# Patient Record
Sex: FEMALE | Race: BLACK OR AFRICAN AMERICAN | NOT HISPANIC OR LATINO | Employment: STUDENT | URBAN - METROPOLITAN AREA
[De-identification: names, ages, dates, MRNs, and addresses within clinical notes are randomized per-mention and may not be internally consistent; named-entity substitution may affect disease eponyms.]

---
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
## 2024-07-28 ENCOUNTER — OFFICE VISIT (OUTPATIENT)
Dept: URGENT CARE | Facility: CLINIC | Age: 19
End: 2024-07-28
Payer: MEDICAID

## 2024-07-28 VITALS
TEMPERATURE: 104 F | HEIGHT: 62 IN | OXYGEN SATURATION: 98 % | RESPIRATION RATE: 16 BRPM | BODY MASS INDEX: 35.88 KG/M2 | DIASTOLIC BLOOD PRESSURE: 81 MMHG | WEIGHT: 195 LBS | HEART RATE: 142 BPM | SYSTOLIC BLOOD PRESSURE: 130 MMHG

## 2024-07-28 DIAGNOSIS — U07.1 COVID-19: Primary | ICD-10-CM

## 2024-07-28 DIAGNOSIS — J02.9 SORE THROAT: ICD-10-CM

## 2024-07-28 LAB
CTP QC/QA: YES
FLUAV AG NPH QL: NEGATIVE
FLUBV AG NPH QL: NEGATIVE
S PYO RRNA THROAT QL PROBE: NEGATIVE
SARS-COV-2 AG RESP QL IA.RAPID: POSITIVE

## 2024-07-28 PROCEDURE — 99204 OFFICE O/P NEW MOD 45 MIN: CPT | Mod: S$GLB,,,

## 2024-07-28 PROCEDURE — 87811 SARS-COV-2 COVID19 W/OPTIC: CPT | Mod: QW,S$GLB,,

## 2024-07-28 PROCEDURE — 87804 INFLUENZA ASSAY W/OPTIC: CPT | Mod: QW,,,

## 2024-07-28 PROCEDURE — 87880 STREP A ASSAY W/OPTIC: CPT | Mod: QW,,,

## 2024-07-28 RX ORDER — BROMPHENIRAMINE MALEATE, PSEUDOEPHEDRINE HYDROCHLORIDE, AND DEXTROMETHORPHAN HYDROBROMIDE 2; 30; 10 MG/5ML; MG/5ML; MG/5ML
10 SYRUP ORAL 4 TIMES DAILY PRN
Qty: 118 ML | Refills: 0 | Status: SHIPPED | OUTPATIENT
Start: 2024-07-28 | End: 2024-08-07

## 2024-07-28 RX ORDER — PROMETHAZINE HYDROCHLORIDE AND DEXTROMETHORPHAN HYDROBROMIDE 6.25; 15 MG/5ML; MG/5ML
5 SYRUP ORAL NIGHTLY PRN
Qty: 50 ML | Refills: 0 | Status: SHIPPED | OUTPATIENT
Start: 2024-07-28 | End: 2024-08-07

## 2024-07-28 RX ORDER — NIRMATRELVIR AND RITONAVIR 300-100 MG
KIT ORAL
Qty: 30 TABLET | Refills: 0 | Status: SHIPPED | OUTPATIENT
Start: 2024-07-28

## 2024-07-28 RX ORDER — ALBUTEROL SULFATE 90 UG/1
2 AEROSOL, METERED RESPIRATORY (INHALATION) EVERY 6 HOURS PRN
Qty: 6.7 G | Refills: 0 | Status: SHIPPED | OUTPATIENT
Start: 2024-07-28 | End: 2024-08-27

## 2024-07-28 NOTE — PATIENT INSTRUCTIONS
Promethazine DM at nighttime only.  The medication does cause sedation    Take Paxlovid with a strong juice such as orange juice to help with after taste, and sore throat.    There is a 20% chance you will develop rebound COVID while taking Paxlovid.  This means if you develop symptoms again in 14 days to get retested.    Do not take any additional antihistamines, or decongestants while taking Bromfed.

## 2024-07-28 NOTE — LETTER
July 31, 2024      [facility]  [address]  [facility] [address]  Phone: [phone]       Patient: Chandan Evans   YOB: 2005  Date of Visit: 07/31/2024    To Whom It May Concern:    Angel Evans  was at [facility] on 07/31/2024. The patient may return to work/school on 08/01/2024 with no restrictions. If you have any questions or concerns, or if I can be of further assistance, please do not hesitate to contact me.    Sincerely,    Alie Schmidt, RT

## 2024-07-28 NOTE — LETTER
July 28, 2024      [facility]  [address]  [facility] [address]  Phone: [phone]       Patient: Chandan Evans   YOB: 2005  Date of Visit: 07/28/2024    To Whom It May Concern:    Angel Evans  was at [facility] on 07/28/2024. The patient may return to work/school on 8/3/24 with no restrictions. If you have any questions or concerns, or if I can be of further assistance, please do not hesitate to contact me.    Sincerely,    LUCÍA Burrows

## 2024-07-28 NOTE — PROGRESS NOTES
"Subjective:      Patient ID: Chandan Evans is a 18 y.o. female.    Vitals:  height is 5' 2" (1.575 m) and weight is 88.5 kg (195 lb). Her temperature is 103.7 °F (39.8 °C) (abnormal). Her blood pressure is 130/81 and her pulse is 142 (abnormal). Her respiration is 16 and oxygen saturation is 98%.     Chief Complaint: Sore Throat and Cough    In clinic with a chief complaint of fever, sore throat, and dry cough that began yesterday morning.  Patient is not COVID vaccinated, but has had previous COVID-19 infections.        Constitution: Positive for fever.   HENT:  Positive for congestion and sore throat.    Neck: neck negative.   Cardiovascular: Negative.    Eyes: Negative.    Respiratory:  Positive for cough. Negative for sputum production.    Gastrointestinal: Negative.    Endocrine: negative.   Genitourinary: Negative.    Musculoskeletal:  Positive for muscle ache.   Skin: Negative.    Allergic/Immunologic: Positive for immunizations up-to-date. Negative for flu shot.   Neurological:  Positive for headaches.   Hematologic/Lymphatic: Negative.    Psychiatric/Behavioral: Negative.        Objective:     Physical Exam   Constitutional: She is oriented to person, place, and time. She is cooperative.   HENT:   Head: Normocephalic and atraumatic.   Ears:   Right Ear: Tympanic membrane, external ear and ear canal normal.   Left Ear: Tympanic membrane, external ear and ear canal normal.   Nose: Nose normal.   Mouth/Throat: Uvula is midline and mucous membranes are normal. Mucous membranes are moist. Posterior oropharyngeal erythema present. No oropharyngeal exudate. Oropharynx is clear.   Eyes: Conjunctivae and lids are normal. Pupils are equal, round, and reactive to light. Extraocular movement intact   Neck: Trachea normal and phonation normal. Neck supple.   Cardiovascular: Regular rhythm, normal heart sounds and normal pulses. Tachycardia present.   Pulmonary/Chest: Effort normal and breath sounds normal. "   Abdominal: Normal appearance. Soft. flat abdomen There is no abdominal tenderness.   Musculoskeletal: Normal range of motion.         General: Normal range of motion.   Neurological: no focal deficit. She is alert, oriented to person, place, and time and at baseline. She has normal motor skills, normal sensation and intact cranial nerves (2-12). Gait and coordination normal. GCS eye subscore is 4. GCS verbal subscore is 5. GCS motor subscore is 6.   Skin: Skin is warm and dry. Capillary refill takes 2 to 3 seconds.   Psychiatric: She experiences Normal attention and Normal perception. Her speech is normal and behavior is normal. Mood, judgment and thought content normal.   Nursing note and vitals reviewed.      Assessment:     1. COVID-19    2. Sore throat    1 COVID risk score        Plan:       COVID-19  -     albuterol (PROVENTIL HFA) 90 mcg/actuation inhaler; Inhale 2 puffs into the lungs every 6 (six) hours as needed for Wheezing. Rescue  Dispense: 6.7 g; Refill: 0  -     promethazine-dextromethorphan (PROMETHAZINE-DM) 6.25-15 mg/5 mL Syrp; Take 5 mLs by mouth nightly as needed (night time cough).  Dispense: 50 mL; Refill: 0  -     nirmatrelvir-ritonavir (PAXLOVID) 300 mg (150 mg x 2)-100 mg copackaged tablets (EUA); Take 3 tablets by mouth 2 (two) times daily. Each dose contains 2 nirmatrelvir (pink tablets) and 1 ritonavir (white tablet). Take all 3 tablets together  Dispense: 30 tablet; Refill: 0  -     brompheniramine-pseudoeph-DM (BROMFED DM) 2-30-10 mg/5 mL Syrp; Take 10 mLs by mouth 4 (four) times daily as needed (Cough).  Dispense: 118 mL; Refill: 0    Sore throat  -     SARS Coronavirus 2 Antigen, POCT Manual Read  -     POCT Influenza A/B Rapid Antigen  -     POCT rapid strep A      Risk score 0.  Rapid COVID positive.  Mild symptoms.  However oral temp of 103.7° with a 142 pulse.  She has been encouraged to begin defervescing at home alternate between Tylenol and Motrin.  Paxlovid prescribed as  well even though she has a low score.  She is also a type 2 diabetic.  ER precautions discussed with the patient

## 2024-08-16 ENCOUNTER — OFFICE VISIT (OUTPATIENT)
Dept: URGENT CARE | Facility: CLINIC | Age: 19
End: 2024-08-16
Payer: MEDICAID

## 2024-08-16 VITALS
DIASTOLIC BLOOD PRESSURE: 95 MMHG | HEART RATE: 91 BPM | OXYGEN SATURATION: 100 % | SYSTOLIC BLOOD PRESSURE: 136 MMHG | TEMPERATURE: 98 F | RESPIRATION RATE: 18 BRPM

## 2024-08-16 DIAGNOSIS — N89.8 VAGINAL IRRITATION: Primary | ICD-10-CM

## 2024-08-16 DIAGNOSIS — Z86.19 HISTORY OF CHLAMYDIA: ICD-10-CM

## 2024-08-16 DIAGNOSIS — Z11.3 ROUTINE SCREENING FOR STI (SEXUALLY TRANSMITTED INFECTION): ICD-10-CM

## 2024-08-16 LAB
B-HCG UR QL: NEGATIVE
BILIRUB UR QL STRIP: NEGATIVE
CTP QC/QA: YES
GLUCOSE UR QL STRIP: NEGATIVE
KETONES UR QL STRIP: NEGATIVE
LEUKOCYTE ESTERASE UR QL STRIP: NEGATIVE
PH, POC UA: 6
POC BLOOD, URINE: NEGATIVE
POC NITRATES, URINE: NEGATIVE
PROT UR QL STRIP: NEGATIVE
SP GR UR STRIP: 1 (ref 1–1.03)
UROBILINOGEN UR STRIP-ACNC: 0.2 (ref 0.1–1.1)

## 2024-08-17 NOTE — PROGRESS NOTES
Subjective:      Patient ID: Chandan Evans is a 18 y.o. female.    Vitals:  temperature is 97.9 °F (36.6 °C). Her blood pressure is 136/95 (abnormal) and her pulse is 91. Her respiration is 18 and oxygen saturation is 100%.     Chief Complaint: Female  Problem    Patient is a 18-year-old female who presents to clinic for STI check.  Patient reports history of chlamydia.  Patient reports last with chlamydia in June of 2024.  Patient reports treated with doxycycline.  Patient reports had urinalysis at [facility] which showed chlamydia.  Patient reports that she returned after treatment and was told it was negative.  Patient reports that she has began having some vaginal irritation over the past few days.  Patient states she has not had any dysuria, urinary frequency, urinary urgency, urinary hesitancy or retention, flank pain or pelvic pain, vaginal bleeding, vaginal discharge, vaginal odor, vaginal itching, back pain, abdominal pain, nausea or vomiting, fever or chills.  Patient states just feels agitated down there.  Patient states no over-the-counter medications for symptoms at this point.  Patient reports no significant history of UTIs.  Patient reports no concern for pregnancy.  Patient reports that she does not believes she has a yeast infection.  Patient states that she was sexually active unprotected with 1 individual.        Constitution: Negative. Negative for chills, sweating, fatigue and fever.   HENT: Negative.     Neck: neck negative.   Cardiovascular: Negative.  Negative for chest pain and palpitations.   Eyes: Negative.    Respiratory: Negative.  Negative for chest tightness, cough and shortness of breath.    Gastrointestinal: Negative.  Negative for abdominal pain, nausea, vomiting and diarrhea.   Endocrine: negative.   Genitourinary:  Negative for dysuria, frequency, urgency, urine decreased, flank pain, vaginal discharge, vaginal bleeding, vaginal odor and pelvic pain.        Reports  having some vaginal irritation   Musculoskeletal: Negative.  Negative for back pain.   Skin: Negative.  Negative for color change, pale, rash and erythema.   Allergic/Immunologic: Negative.    Neurological: Negative.  Negative for dizziness, light-headedness, passing out, headaches, disorientation and altered mental status.   Hematologic/Lymphatic: Negative.    Psychiatric/Behavioral: Negative.  Negative for altered mental status, disorientation and confusion.       Objective:     Physical Exam   Constitutional: She is oriented to person, place, and time. She appears well-developed. She is cooperative.  Non-toxic appearance. She does not appear ill. No distress.   HENT:   Head: Normocephalic and atraumatic.   Ears:   Right Ear: Hearing and external ear normal.   Left Ear: Hearing and external ear normal.   Nose: Nose normal. No mucosal edema or nasal deformity. No epistaxis. Right sinus exhibits no maxillary sinus tenderness and no frontal sinus tenderness. Left sinus exhibits no maxillary sinus tenderness and no frontal sinus tenderness.   Mouth/Throat: Uvula is midline, oropharynx is clear and moist and mucous membranes are normal. No trismus in the jaw. Normal dentition. No uvula swelling.   Eyes: Conjunctivae and lids are normal. Pupils are equal, round, and reactive to light.   Neck: Trachea normal and phonation normal. Neck supple.   Cardiovascular: Normal rate, regular rhythm, normal heart sounds and normal pulses.   Pulmonary/Chest: Effort normal and breath sounds normal. No respiratory distress. She has no wheezes. She has no rhonchi. She has no rales.   Abdominal: Normal appearance and bowel sounds are normal. She exhibits no distension. Soft. There is no abdominal tenderness. There is no rebound, no guarding, no left CVA tenderness and no right CVA tenderness.   Genitourinary:         Comments: Deferred by patient.     Musculoskeletal: Normal range of motion.         General: Normal range of motion.    Neurological: She is alert and oriented to person, place, and time. She exhibits normal muscle tone.   Skin: Skin is warm, dry, intact, not diaphoretic, not pale and no rash. Capillary refill takes less than 2 seconds. No erythema   Psychiatric: Her speech is normal and behavior is normal. Judgment and thought content normal.   Nursing note and vitals reviewed.      Assessment:     1. Vaginal irritation    2. History of chlamydia    3. Routine screening for STI (sexually transmitted infection)        Plan:       Vaginal irritation  -     POCT Urinalysis, Dipstick, Manual, W/O Scope  -     POCT urine pregnancy  -     CT/NG, T. vaginalis; Future; Expected date: 08/16/2024    History of chlamydia  -     POCT Urinalysis, Dipstick, Manual, W/O Scope  -     POCT urine pregnancy  -     CT/NG, T. vaginalis; Future; Expected date: 08/16/2024    Routine screening for STI (sexually transmitted infection)                Patient refused NuSwab.  Patient reports has a Pap coming up with OBGYN and would get swabbed there.  UA:  Negative  UPT:  Negative  Urine STI panel collected; will call with results.    Recommend avoidance of sexual intercourse until receiving results.    Tylenol/Motrin per package instructions for any pain or fever.    Assure adequate hydration.    Follow-up with PCP or OBGYN in 1-2 days.    Return to clinic as needed.    To ED for any new or acutely worsening symptoms.    Patient in agreement with plan of care.    DISCLAIMER: Please note that my documentation in this Electronic Healthcare Record was produced using speech recognition software and therefore may contain errors related to that software system.These could include grammar, punctuation and spelling errors or the inclusion/exclusion of phrases that were not intended. Garbled syntax, mangled pronouns, and other bizarre constructions may be attributed to that software system.

## 2024-08-21 ENCOUNTER — OFFICE VISIT (OUTPATIENT)
Dept: URGENT CARE | Facility: CLINIC | Age: 19
End: 2024-08-21
Payer: MEDICAID

## 2024-08-21 ENCOUNTER — TELEPHONE (OUTPATIENT)
Dept: URGENT CARE | Facility: CLINIC | Age: 19
End: 2024-08-21
Payer: MEDICAID

## 2024-08-21 VITALS
DIASTOLIC BLOOD PRESSURE: 94 MMHG | WEIGHT: 201 LBS | OXYGEN SATURATION: 99 % | TEMPERATURE: 98 F | SYSTOLIC BLOOD PRESSURE: 139 MMHG | BODY MASS INDEX: 36.99 KG/M2 | HEART RATE: 83 BPM | RESPIRATION RATE: 16 BRPM | HEIGHT: 62 IN

## 2024-08-21 DIAGNOSIS — A54.9 GONORRHEA: Primary | ICD-10-CM

## 2024-08-21 DIAGNOSIS — N76.0 ACUTE VAGINITIS: ICD-10-CM

## 2024-08-21 PROCEDURE — 99214 OFFICE O/P EST MOD 30 MIN: CPT | Mod: S$GLB,,, | Performed by: STUDENT IN AN ORGANIZED HEALTH CARE EDUCATION/TRAINING PROGRAM

## 2024-08-21 RX ORDER — CEFTRIAXONE 1 G/1
1 INJECTION, POWDER, FOR SOLUTION INTRAMUSCULAR; INTRAVENOUS
Status: COMPLETED | OUTPATIENT
Start: 2024-08-21 | End: 2024-08-21

## 2024-08-21 RX ORDER — DESOGESTREL AND ETHINYL ESTRADIOL 0.15-0.03
1 KIT ORAL
COMMUNITY
Start: 2024-07-31

## 2024-08-21 RX ORDER — FLUCONAZOLE 150 MG/1
150 TABLET ORAL
Qty: 3 TABLET | Refills: 0 | Status: SHIPPED | OUTPATIENT
Start: 2024-08-21

## 2024-08-21 RX ORDER — METFORMIN HYDROCHLORIDE 500 MG/1
500 TABLET ORAL 2 TIMES DAILY
COMMUNITY
Start: 2024-04-11

## 2024-08-21 RX ORDER — ASPIRIN 325 MG
TABLET, DELAYED RELEASE (ENTERIC COATED) ORAL
COMMUNITY

## 2024-08-21 RX ADMIN — CEFTRIAXONE 1 G: 1 INJECTION, POWDER, FOR SOLUTION INTRAMUSCULAR; INTRAVENOUS at 07:08

## 2024-08-21 NOTE — PROGRESS NOTES
"Subjective:      Patient ID: Chandan Evans is a 18 y.o. female.    Vitals:  height is 5' 2" (1.575 m) and weight is 91.2 kg (201 lb). Her oral temperature is 98.3 °F (36.8 °C). Her blood pressure is 139/94 (abnormal) and her pulse is 83. Her respiration is 16 and oxygen saturation is 99%.     Chief Complaint: Exposure to STD (Return for Shot)    Patient is a 18-year-old female who returns to clinic for Rocephin injection.  Patient notified this morning that her urine STI panel returned positive for gonorrhea.  Patient reports that she does have a history of this.  Patient reports she is no longer sexually active as hurting her partner are done.  Patient reports that she continues to only have that of vaginal irritation to include itching.  Patient states she still does not wish to be swabbed and will let her OBGYN do this at her next follow-up.    Visit note from 08/16/2024: Patient is a 18-year-old female who presents to clinic for STI check.  Patient reports history of chlamydia.  Patient reports last with chlamydia in June of 2024.  Patient reports treated with doxycycline.  Patient reports had urinalysis at [facility] which showed chlamydia.  Patient reports that she returned after treatment and was told it was negative.  Patient reports that she has began having some vaginal irritation over the past few days.  Patient states she has not had any dysuria, urinary frequency, urinary urgency, urinary hesitancy or retention, flank pain or pelvic pain, vaginal bleeding, vaginal discharge, vaginal odor, vaginal itching, back pain, abdominal pain, nausea or vomiting, fever or chills.  Patient states just feels agitated down there.  Patient states no over-the-counter medications for symptoms at this point.  Patient reports no significant history of UTIs.  Patient reports no concern for pregnancy.  Patient reports that she does not believes she has a yeast infection.  Patient states that she was sexually active " unprotected with 1 individual.         Exposure to STD   The patient's primary symptoms include a discharge. The patient's pertinent negatives include no dysuria, genital itching or pelvic pain. This is a new problem. The current episode started in the past 7 days. The problem has been unchanged. The patient is experiencing no pain.The vaginal discharge was normal. Associated symptoms include rash. Pertinent negatives include no abdominal pain, chills, diaphoresis, diarrhea, fatigue, fever, flank pain, headaches, nausea, urinary frequency or vomiting. The symptoms are aggravated by: nothing.She has tried nothing for the symptoms. The treatment provided no relief.       Constitution: Negative. Negative for chills, sweating, fatigue and fever.   HENT: Negative.     Neck: neck negative.   Cardiovascular: Negative.  Negative for chest pain and palpitations.   Eyes: Negative.    Respiratory: Negative.  Negative for chest tightness, cough and shortness of breath.    Gastrointestinal: Negative.  Negative for abdominal pain, nausea, vomiting and diarrhea.   Endocrine: negative.   Genitourinary:  Negative for dysuria, frequency, urgency, urine decreased, flank pain, vaginal discharge, vaginal bleeding, vaginal odor, genital sore and pelvic pain.        Vaginal irritation (itching)   Musculoskeletal: Negative.    Skin: Negative.  Negative for color change, pale, rash and erythema.   Allergic/Immunologic: Negative.    Neurological: Negative.  Negative for dizziness, light-headedness, passing out, headaches, disorientation and altered mental status.   Hematologic/Lymphatic: Negative.    Psychiatric/Behavioral: Negative.  Negative for altered mental status, disorientation and confusion.       Objective:     Physical Exam   Constitutional: She is oriented to person, place, and time. She appears well-developed. She is cooperative.  Non-toxic appearance. She does not appear ill. No distress.   HENT:   Head: Normocephalic and  atraumatic.   Ears:   Right Ear: Hearing and external ear normal.   Left Ear: Hearing and external ear normal.   Nose: Nose normal. No mucosal edema or nasal deformity. No epistaxis. Right sinus exhibits no maxillary sinus tenderness and no frontal sinus tenderness. Left sinus exhibits no maxillary sinus tenderness and no frontal sinus tenderness.   Mouth/Throat: Uvula is midline, oropharynx is clear and moist and mucous membranes are normal. No trismus in the jaw. Normal dentition. No uvula swelling.   Eyes: Conjunctivae and lids are normal. Pupils are equal, round, and reactive to light.   Neck: Trachea normal and phonation normal. Neck supple.   Cardiovascular: Normal rate, regular rhythm, normal heart sounds and normal pulses.   Pulmonary/Chest: Effort normal and breath sounds normal. No respiratory distress. She has no wheezes. She has no rhonchi. She has no rales.   Abdominal: Normal appearance and bowel sounds are normal. She exhibits no distension. Soft. There is no abdominal tenderness. There is no rebound, no guarding, no left CVA tenderness and no right CVA tenderness.   Genitourinary:    Labial rash present.           Comments: Deferred by patient     Musculoskeletal: Normal range of motion.         General: Normal range of motion.   Neurological: She is alert and oriented to person, place, and time. She exhibits normal muscle tone.   Skin: Skin is warm, dry, intact, not diaphoretic, not pale and no rash. Capillary refill takes less than 2 seconds. No erythema   Psychiatric: Her speech is normal and behavior is normal. Judgment and thought content normal.   Nursing note and vitals reviewed.    Assessment:     1. Gonorrhea    2. Acute vaginitis        Plan:       Gonorrhea    Acute vaginitis    Other orders  -     cefTRIAXone injection 1 g  -     fluconazole (DIFLUCAN) 150 MG Tab; Take 1 tablet (150 mg total) by mouth every 72 hours.  Dispense: 3 tablet; Refill: 0                Previous visit and labs  reviewed.    Rocephin 1 g IM in clinic.  Patient tolerated well.  No complications noted.    Take medications as prescribed.    Patient refused need for coverage of that of chlamydia.    Follow-up with PCP or OBGYN in 1-2 days.    Advised to notify partners of results however states she does no longer have any partners.    Return to clinic as needed.    To ED for any new or acutely worsening symptoms.    Patient in agreement with plan of care.    DISCLAIMER: Please note that my documentation in this Electronic Healthcare Record was produced using speech recognition software and therefore may contain errors related to that software system.These could include grammar, punctuation and spelling errors or the inclusion/exclusion of phrases that were not intended. Garbled syntax, mangled pronouns, and other bizarre constructions may be attributed to that software system.

## 2024-08-21 NOTE — TELEPHONE ENCOUNTER
Patient called and notified of positive urine STI results.  Positive gonorrhea results.  Patient reports will present to clinic this afternoon for Rocephin injection.

## 2025-04-07 ENCOUNTER — OFFICE VISIT (OUTPATIENT)
Dept: URGENT CARE | Facility: CLINIC | Age: 20
End: 2025-04-07
Payer: COMMERCIAL

## 2025-04-07 VITALS
HEIGHT: 62 IN | OXYGEN SATURATION: 99 % | WEIGHT: 201 LBS | RESPIRATION RATE: 18 BRPM | DIASTOLIC BLOOD PRESSURE: 79 MMHG | TEMPERATURE: 99 F | BODY MASS INDEX: 36.99 KG/M2 | SYSTOLIC BLOOD PRESSURE: 138 MMHG | HEART RATE: 89 BPM

## 2025-04-07 DIAGNOSIS — K59.00 CONSTIPATION, UNSPECIFIED CONSTIPATION TYPE: ICD-10-CM

## 2025-04-07 DIAGNOSIS — M54.9 BACK PAIN, UNSPECIFIED BACK LOCATION, UNSPECIFIED BACK PAIN LATERALITY, UNSPECIFIED CHRONICITY: Primary | ICD-10-CM

## 2025-04-07 PROBLEM — L83 ACANTHOSIS NIGRICANS: Status: ACTIVE | Noted: 2025-04-07

## 2025-04-07 PROBLEM — R94.6 ABNORMAL FINDING ON THYROID FUNCTION TEST: Status: ACTIVE | Noted: 2023-03-15

## 2025-04-07 PROBLEM — E88.819 INSULIN RESISTANCE: Status: ACTIVE | Noted: 2021-03-11

## 2025-04-07 LAB
B-HCG UR QL: NEGATIVE
BILIRUB UR QL STRIP: NEGATIVE
CTP QC/QA: YES
GLUCOSE UR QL STRIP: NEGATIVE
KETONES UR QL STRIP: NEGATIVE
LEUKOCYTE ESTERASE UR QL STRIP: NEGATIVE
PH, POC UA: 6
POC BLOOD, URINE: NEGATIVE
POC NITRATES, URINE: NEGATIVE
PROT UR QL STRIP: NEGATIVE
SP GR UR STRIP: 1.01 (ref 1–1.03)
UROBILINOGEN UR STRIP-ACNC: 0.2 (ref 0.1–1.1)

## 2025-04-07 PROCEDURE — 74018 RADEX ABDOMEN 1 VIEW: CPT | Mod: S$GLB,,, | Performed by: RADIOLOGY

## 2025-04-07 PROCEDURE — 99214 OFFICE O/P EST MOD 30 MIN: CPT | Mod: S$GLB,,, | Performed by: NURSE PRACTITIONER

## 2025-04-07 PROCEDURE — 81003 URINALYSIS AUTO W/O SCOPE: CPT | Mod: QW,S$GLB,, | Performed by: NURSE PRACTITIONER

## 2025-04-07 PROCEDURE — 81025 URINE PREGNANCY TEST: CPT | Mod: S$GLB,,, | Performed by: NURSE PRACTITIONER

## 2025-04-07 RX ORDER — DOXYCYCLINE 100 MG/1
100 CAPSULE ORAL 2 TIMES DAILY
COMMUNITY
Start: 2024-11-20 | End: 2025-04-07

## 2025-04-07 RX ORDER — TRIAMCINOLONE ACETONIDE 1 MG/G
CREAM TOPICAL
COMMUNITY
Start: 2025-01-31

## 2025-04-07 NOTE — PROGRESS NOTES
"Subjective:      Patient ID: Chandan Evans is a 19 y.o. female.    Vitals:  height is 5' 2" (1.575 m) and weight is 91.2 kg (201 lb). Her oral temperature is 98.6 °F (37 °C). Her blood pressure is 138/79 and her pulse is 89. Her respiration is 18 and oxygen saturation is 99%.     Chief Complaint: Back Pain    Onset of symptoms 1+ week reporting that she has intermittent pain/discomfort to her left side symptoms radiates across to her lower mid back.  Denies injury or trauma, denies similar occurrences in the past.  States that she has been experiencing some constipation and did take MiraLax on Friday with results Saturday prior to the she was passing small nugget side stool    Back Pain  This is a new problem. The current episode started in the past 7 days. The problem occurs constantly. The problem is unchanged. The pain is present in the thoracic spine, lumbar spine and gluteal. The quality of the pain is described as stabbing. The pain does not radiate. The pain is moderate. The pain is Worse during the night. Pertinent negatives include no abdominal pain, dysuria, fever or pelvic pain.       Constitution: Positive for appetite change (Early satiety). Negative for activity change, fatigue, fever and generalized weakness.   HENT:  Negative for congestion and sore throat.    Respiratory:  Negative for chest tightness, cough and shortness of breath.    Gastrointestinal:  Positive for constipation. Negative for abdominal trauma, abdominal pain, nausea, vomiting, diarrhea, bright red blood in stool and dark colored stools.   Genitourinary:  Negative for dysuria, frequency, urgency, flank pain, hematuria, vaginal discharge, vaginal bleeding, vaginal odor, genital sore and pelvic pain.   Musculoskeletal:  Positive for pain (Left side) and back pain. Negative for trauma and muscle ache.   Skin:  Negative for rash, wound, lesion, erythema and bruising.      Objective:     Physical Exam   Constitutional: She is " oriented to person, place, and time. She is cooperative.  Non-toxic appearance. She does not appear ill. No distress. obesityawake  HENT:   Head: Normocephalic and atraumatic.   Nose: No rhinorrhea or congestion.   Mouth/Throat: Mucous membranes are moist.   Eyes: Conjunctivae are normal. Right eye exhibits no discharge. Left eye exhibits no discharge.   Cardiovascular: Normal rate, regular rhythm and normal heart sounds.   Pulmonary/Chest: Effort normal and breath sounds normal. No accessory muscle usage. No tachypnea. No respiratory distress. She has no wheezes. She has no rhonchi. She has no rales. She exhibits no tenderness.   Abdominal: Normal appearance. She exhibits no distension. Soft. flat abdomen There is no abdominal tenderness. There is no rebound, no guarding, no left CVA tenderness and no right CVA tenderness.   Neurological: no focal deficit. She is alert and oriented to person, place, and time. No sensory deficit.   Skin: Skin is warm, dry, not diaphoretic and no rash. Capillary refill takes 2 to 3 seconds. No erythema   Psychiatric: Her behavior is normal. Mood normal.   Nursing note and vitals reviewed.      Assessment:     1. Back pain, unspecified back location, unspecified back pain laterality, unspecified chronicity    2. Constipation, unspecified constipation type      UPT:  Negative    UA:  Negative    KUB:  FINDINGS:  There are postoperative changes in the right upper abdominal quadrant.  There is overall paucity of bowel gas within the small bowel loops.  There is large colonic stool burden.     There is no evidence of pneumatosis.  No portal venous air is identified.  There is no gross pneumoperitoneum.     There are no abnormal calcifications.  The osseous structures are unremarkable.     Impression:     Unremarkable examination  Plan:       Back pain, unspecified back location, unspecified back pain laterality, unspecified chronicity  -     POCT Urinalysis, Dipstick, Automated, W/O  Scope  -     POCT urine pregnancy  -     XR KUB; Future; Expected date: 04/07/2025    Constipation, unspecified constipation type

## 2025-04-07 NOTE — LETTER
April 7, 2025      [facility]  [address]  [facility] [address]  Phone: [phone]       Patient: Chandan Evans   YOB: 2005  Date of Visit: 04/07/2025    To Whom It May Concern:    Angel Evans  was at [facility] on 04/07/2025. The patient may return to work/school on 04/08/2025  with no restrictions. If you have any questions or concerns, or if I can be of further assistance, please do not hesitate to contact me.    Sincerely,    Lanette Blake, NP

## 2025-04-07 NOTE — PATIENT INSTRUCTIONS
Increase your fluid intake significantly  Increase your fiber intake    MiraLax once a day to keep your stool soft but formed.  If stool loses formed then take MiraLax once every other day however you do need to continue this practice for a minimum of 4-6 weeks as we discussed    ER for any worsening of symptoms as we discussed

## (undated) DEVICE — BLADE SURG CARBON STEEL #10

## (undated) DEVICE — STAPLER SKIN ROTATING HEAD

## (undated) DEVICE — SPONGE DERMACEA GAUZE 4X4

## (undated) DEVICE — SUT 2/0 30IN SILK BLK BRAI

## (undated) DEVICE — ELECTRODE REM PLYHSV RETURN 9

## (undated) DEVICE — BOWL STERILE LARGE 32OZ

## (undated) DEVICE — SUT MONOCRYL 3-0 SH U/D

## (undated) DEVICE — Device

## (undated) DEVICE — DRAPE STERI LONG

## (undated) DEVICE — DRESSING ABD OPEN GRANUFOAM

## (undated) DEVICE — SUT V-LOC 180 ABD 2/0 GS-21

## (undated) DEVICE — SUT MONOCRYL 4-0 UND RB-1

## (undated) DEVICE — SUT PDSII 3-0 SH 27IN CLEAR

## (undated) DEVICE — POWDER ARISTA AH 1GM

## (undated) DEVICE — SET DECANTER MEDICHOICE

## (undated) DEVICE — SYR 50CC LL

## (undated) DEVICE — SUT ETHILON 4-0 PS2 18 BLK

## (undated) DEVICE — TIP YANKAUERS BULB NO VENT

## (undated) DEVICE — NDL SPINAL 18GX3.5 SPINOCAN

## (undated) DEVICE — ELECTRODE BLADE INSULATED 1 IN

## (undated) DEVICE — SUT VICRYL CTD 2-0 GI 27 SH

## (undated) DEVICE — EVACUATOR WOUND BULB 100CC

## (undated) DEVICE — GOWN SURGICAL X-LARGE

## (undated) DEVICE — ADHESIVE DERMABOND ADVANCED

## (undated) DEVICE — TRAY MINOR GEN SURG

## (undated) DEVICE — BLADE SURG #15 CARBON STEEL

## (undated) DEVICE — NDL HYPO 27G X 1 1/2

## (undated) DEVICE — SPONGE SPLIT DRAIN 6 PLY 4X4IN

## (undated) DEVICE — SPONGE LAP 18X18 PREWASHED

## (undated) DEVICE — SPONGE IV DRAIN 4X4 STERILE

## (undated) DEVICE — NDL HYPO REG 25G X 1 1/2

## (undated) DEVICE — TRAY CATH FOLEY 14FR 5CC STAT

## (undated) DEVICE — PACK UNIVERSAL SPLIT II

## (undated) DEVICE — PAD ABD 8X10 STERILE

## (undated) DEVICE — SUT SILK 0 SH 30IN BLK BR

## (undated) DEVICE — CONTAINER SPECIMEN OR STER 4OZ

## (undated) DEVICE — TRAY SKIN SCRUB WET PREMIUM

## (undated) DEVICE — MARKER SKIN STND TIP BLUE BARR